# Patient Record
(demographics unavailable — no encounter records)

---

## 2024-12-24 NOTE — HISTORY OF PRESENT ILLNESS
[Headache] : headache [___ Times Per Week] : [unfilled] times each week [improved] : improved [FreeTextEntry1] : She is a 47 year old right handed lady. HPI from [facility] 11/8/24: 46 yo F w no medical history presenting to ED as code stroke for blurry vision, onset 1145, duration 10 minutes, described as blurry vision, patient does not know if it was in both eyes for sure as she did not try covering one eye then the other but feels that both eyes were blurry. Patient reported a sensation of shivers during the blurry vision. Patient also endorsed a headache, mild, located over top of head and center of forehead between eyebrows, onset AFTER at time of blurry vision, that he been constant since 1145. The patient went to an urgent care who told her to come to the ED.  In ED, patient only sx was headache as described above. Patient wears glasses and feels vision and speech are clear.  Workup includes: CT brain: No hydrocephalus, acute intracranial hemorrhage, mass effect, or brain edema. CTA brain: No flow-limiting stenosis or vascular aneurysm. No AVM. Venous system is well opacified, no evidence for venous sinus or cortical vein thrombosis. CTA neck: No flow-limiting stenosis, evidence for arterial dissection, or vascular aneurysm.  12/24/24 She presents to the office today with her daughter.  On 11/8/2024, she had an episode of blurry vision and a severe headache.  She was treated at [facility] with a migraine cocktail with resolution.  Since then, she has been getting frequent headaches, approximately 3-4 times a week; not associated with photosensitivity, phono sensitivity, nausea or vomiting.  She no longer has blurry vision, but feels that her eyes are dry.  She feels that her headaches are precipitated by stress, as she has been under a great deal of stress at her job.  The headaches are located at the top of her head.  Prior to her episode on 11/8/24, she never used to get headaches.  She denies any focal neurologic symptoms.    PCP Dr. Tono Link [Dizziness] : no dizziness [Nausea] : no nausea [Vomiting] : no Vomiting [Photophobia] : no photophobia [Phonophobia] : no phonophobia [de-identified] : eyes still feel dry [de-identified] : 3-4 hours

## 2024-12-24 NOTE — ASSESSMENT
[FreeTextEntry1] : On 11/8/2024, she developed blurry vision for 10 minutes followed by a headache.  She presented to [facility], and the episode was thought to be due to a scintillating scotoma consistent with a migraine with aura.  Since this episode, she has been having frequent headaches, without visual changes and without the typical features of migraine.  Her headaches sound consistent with tension type headaches and are likely of benign etiology.  However, given that she never used to experience headaches, secondary pathology should be screened for.  [] Nonfocal exam. [] I have requested an MRI brain, ESR and CRP. [] I recommend she follow-up with her ophthalmologist to assess for contributing ocular pathology. [] I have prescribed nabumetone 750 Mg twice daily X 5 days to try to break her headache cycle.  I advised that she take it with food. [] I recommend she start taking riboflavin 200 400 mg daily for headache prophylaxis. [] We discussed healthy headache habits: eating regular meals, getting regular and sufficient sleep, paying close attention to hydration, participating in aerobic exercise, stress reduction, and avoidance of overuse of acute medications.  She can follow-up in 1 month, following the above investigation.

## 2024-12-24 NOTE — HISTORY OF PRESENT ILLNESS
[Headache] : headache [___ Times Per Week] : [unfilled] times each week [improved] : improved [FreeTextEntry1] : She is a 47 year old right handed lady. HPI from [facility] 11/8/24: 48 yo F w no medical history presenting to ED as code stroke for blurry vision, onset 1145, duration 10 minutes, described as blurry vision, patient does not know if it was in both eyes for sure as she did not try covering one eye then the other but feels that both eyes were blurry. Patient reported a sensation of shivers during the blurry vision. Patient also endorsed a headache, mild, located over top of head and center of forehead between eyebrows, onset AFTER at time of blurry vision, that he been constant since 1145. The patient went to an urgent care who told her to come to the ED.  In ED, patient only sx was headache as described above. Patient wears glasses and feels vision and speech are clear.  Workup includes: CT brain: No hydrocephalus, acute intracranial hemorrhage, mass effect, or brain edema. CTA brain: No flow-limiting stenosis or vascular aneurysm. No AVM. Venous system is well opacified, no evidence for venous sinus or cortical vein thrombosis. CTA neck: No flow-limiting stenosis, evidence for arterial dissection, or vascular aneurysm.  12/24/24 She presents to the office today with her daughter.  On 11/8/2024, she had an episode of blurry vision and a severe headache.  She was treated at [facility] with a migraine cocktail with resolution.  Since then, she has been getting frequent headaches, approximately 3-4 times a week; not associated with photosensitivity, phono sensitivity, nausea or vomiting.  She no longer has blurry vision, but feels that her eyes are dry.  She feels that her headaches are precipitated by stress, as she has been under a great deal of stress at her job.  The headaches are located at the top of her head.  Prior to her episode on 11/8/24, she never used to get headaches.  She denies any focal neurologic symptoms.    PCP Dr. Tono Link [Dizziness] : no dizziness [Nausea] : no nausea [Vomiting] : no Vomiting [Photophobia] : no photophobia [Phonophobia] : no phonophobia [de-identified] : eyes still feel dry [de-identified] : 3-4 hours

## 2024-12-24 NOTE — CONSULT LETTER
[Dear  ___] : Dear  [unfilled], [Consult Letter:] : I had the pleasure of evaluating your patient, [unfilled]. [Please see my note below.] : Please see my note below. [Consult Closing:] : Thank you very much for allowing me to participate in the care of this patient.  If you have any questions, please do not hesitate to contact me. [Sincerely,] : Sincerely, [FreeTextEntry2] : Tono Link MD [FreeTextEntry3] : Tamika Pacheco, FNP-BC

## 2024-12-24 NOTE — ASSESSMENT
[FreeTextEntry1] : On 11/8/2024, she developed blurry vision for 10 minutes followed by a headache.  She presented to [facility], and the episode was thought to be due to a scintillating scotoma consistent with a migraine with aura.  Since this episode, she has been having frequent headaches, without visual changes and without the typical features of migraine.  Her headaches sound consistent with tension type headaches and are likely of benign etiology.  However, given that she never used to experience headaches, secondary pathology should be screened for.  [] Nonfocal exam. [] I have requested an MRI brain, ESR and CRP. [] I recommend she follow-up with her ophthalmologist to assess for contributing ocular pathology. [] I have prescribed nabumetone 750 Mg twice daily X 5 days to try to break her headache cycle.  I advised that she take it with food. [] I recommend she start taking riboflavin 200 400 mg daily for headache prophylaxis. [] We discussed healthy headache habits: eating regular meals, getting regular and sufficient sleep, paying close attention to hydration, participating in aerobic exercise, stress reduction, and avoidance of overuse of acute medications.  She can follow-up in 1 month, following the above investigation.

## 2024-12-24 NOTE — PHYSICAL EXAM
[FreeTextEntry1] :  Neurologic examination:  Mental status:  The patient is alert, attentive, and oriented. Speech is clear and fluent with good  comprehension.  Cranial nerves:  CN II: Visual fields are full to confrontation. Pupils are 5 mm and briskly reactive to light. bilaterally.  CN III, IV, VI: At primary gaze, there is no eye deviation.EOMI. No ptosis  CN V: Facial sensation is intact bilaterally.  CN VII: Face is symmetric with normal eye closure and smile.  CN VII: Hearing is grossly intact.  CN IX, X: Palate elevates symmetrically. Phonation is normal.  CN XI: Head turning and shoulder shrug are intact  CN XII: Tongue is midline with normal movements and no atrophy.  Motor:  There is no pronator drift of out-stretched arms. Muscle bulk and tone are normal. Strength is full bilaterally.  Sensory:  Light touch intact in fingers and toes.  Coordination:  Fine finger movements are intact. There is no dysmetria on finger-to-nose.    Gait/Stance:  Gait and tandem gait are normal.  Romberg is absent. [General Appearance - In No Acute Distress] : in no acute distress [General Appearance - Alert] : alert [Impaired Insight] : insight and judgment were intact [Affect] : the affect was normal [Sclera] : the sclera and conjunctiva were normal [Extraocular Movements] : extraocular movements were intact [Full Visual Field] : full visual field [Outer Ear] : the ears and nose were normal in appearance [Examination Of The Oral Cavity] : the lips and gums were normal [Neck Appearance] : the appearance of the neck was normal [Neck Cervical Mass (___cm)] : no neck mass was observed [Auscultation Breath Sounds / Voice Sounds] : lungs were clear to auscultation bilaterally [Abnormal Walk] : normal gait [Heart Sounds] : normal S1 and S2 [Nail Clubbing] : no clubbing  or cyanosis of the fingernails [Musculoskeletal - Swelling] : no joint swelling seen [Motor Tone] : muscle strength and tone were normal [Skin Color & Pigmentation] : normal skin color and pigmentation [Skin Turgor] : normal skin turgor [] : no rash

## 2024-12-24 NOTE — PHYSICAL EXAM
[FreeTextEntry1] :  Neurologic examination:  Mental status:  The patient is alert, attentive, and oriented. Speech is clear and fluent with good  comprehension.  Cranial nerves:  CN II: Visual fields are full to confrontation. Pupils are 5 mm and briskly reactive to light. bilaterally.  CN III, IV, VI: At primary gaze, there is no eye deviation.EOMI. No ptosis  CN V: Facial sensation is intact bilaterally.  CN VII: Face is symmetric with normal eye closure and smile.  CN VII: Hearing is grossly intact.  CN IX, X: Palate elevates symmetrically. Phonation is normal.  CN XI: Head turning and shoulder shrug are intact  CN XII: Tongue is midline with normal movements and no atrophy.  Motor:  There is no pronator drift of out-stretched arms. Muscle bulk and tone are normal. Strength is full bilaterally.  Sensory:  Light touch intact in fingers and toes.  Coordination:  Fine finger movements are intact. There is no dysmetria on finger-to-nose.    Gait/Stance:  Gait and tandem gait are normal.  Romberg is absent. [General Appearance - In No Acute Distress] : in no acute distress [General Appearance - Alert] : alert [Impaired Insight] : insight and judgment were intact [Affect] : the affect was normal [Sclera] : the sclera and conjunctiva were normal [Extraocular Movements] : extraocular movements were intact [Full Visual Field] : full visual field [Examination Of The Oral Cavity] : the lips and gums were normal [Outer Ear] : the ears and nose were normal in appearance [Neck Appearance] : the appearance of the neck was normal [Neck Cervical Mass (___cm)] : no neck mass was observed [Auscultation Breath Sounds / Voice Sounds] : lungs were clear to auscultation bilaterally [Abnormal Walk] : normal gait [Heart Sounds] : normal S1 and S2 [Nail Clubbing] : no clubbing  or cyanosis of the fingernails [Musculoskeletal - Swelling] : no joint swelling seen [Motor Tone] : muscle strength and tone were normal [Skin Color & Pigmentation] : normal skin color and pigmentation [Skin Turgor] : normal skin turgor [] : no rash

## 2024-12-24 NOTE — REASON FOR VISIT
6/18/2018              Louisa Cruz        [address], [address]         Dear Erika Limon,     I wanted to get back to you with your colonoscopy results. You had 1 colon polyp removed which was benign.   I would advise a rep [Initial Evaluation] : an initial evaluation [FreeTextEntry1] : headaches